# Patient Record
(demographics unavailable — no encounter records)

---

## 2025-04-01 NOTE — ASSESSMENT
[FreeTextEntry1] : The patient is a 70-year-old male with chief complaint of right knee pain.  He has a several year history of progressive pain in the right knee which is gotten worse over the last few months.  He started taking Motrin and doing physical therapy which is definitely helped.  He is very sore when he drives for period of time but only has mild pain with walking.  He has mild pain at night.  He struggles going from sitting to standing going up and down stairs and gets a clicking in his knee.  He has had no injections.  Examination of the right lower extremity reveals a normal neurovascular exam.  Examination the right knee reveals limited range of motion from 5 to 125 degrees with a varus deformity.  There is medial joint line pain with positive Madhuri's sign.  There is no lateral joint complaints or instability.  There is no effusion.  There is no redness, rashes or visible scars.  X-rays done in the office today of the right knee 4 views weightbearing shows tricompartmental arthritis with bone-on-bone changes of the medial compartment and a varus deformity.  There are no obvious tumors, mass or calcifications seen.  The plan at this time is to start a physical therapy program for the right knee.  This should help with his ability to climb stairs and go from sitting to standing.  He will continue his Advil as needed as that seems to be helping.  I will see him back in the office as needed.

## 2025-04-01 NOTE — HISTORY OF PRESENT ILLNESS
[9] : 9 [4] : 4 [FreeTextEntry5] : 69 y/o M presents for RT knee pain . pt noted to have pain on and off for a few years now . Pt 2x a week has been helping. rt knee klever when driving. motrin as needed

## 2025-04-24 NOTE — HISTORY OF PRESENT ILLNESS
[9] : 9 [4] : 4 [FreeTextEntry5] : 71 y/o M presents for RT knee pain . pt noted to not have much of an improvement since last visit. Pt 2x a week has been helping.

## 2025-04-24 NOTE — ASSESSMENT
[FreeTextEntry1] : The patient is here for right knee pain. He has tried PT with good relief but wishes to consider a CSI as his pain has persisted. He has a several year history of progressive pain in the right knee which is gotten worse over the last few months.  He started taking Motrin and doing physical therapy which is definitely helped.  He is very sore when he drives for period of time but only has mild pain with walking.  He has mild pain at night.  He struggles going from sitting to standing going up and down stairs and gets a clicking in his knee.  Right knee exam: Neurovascularly intact. Sensation intact. Examination the right knee reveals limited range of motion from 5 to 125 degrees with a varus deformity.  There is medial joint line pain with positive Madhuri's sign.  There is no lateral joint complaints or instability.  There is no effusion.  There is no redness, rashes or visible scars.  Under sterile conditions the right knee was injected with 5 cc of quarter percent plain Marcaine; 5 cc of 2% plain lidocaine and 80 mg of Depo-Medrol.  Patient tolerated the procedure well. Ultrasound guidance was used due to patient body habitus and for accuracy of placement.  The patient received a right knee CSI. He tolerated it well. Ultrasound was used. He will return in 4-6 weeks if not better for gel injections.

## 2025-04-24 NOTE — HISTORY OF PRESENT ILLNESS
[9] : 9 [4] : 4 [FreeTextEntry5] : 69 y/o M presents for RT knee pain . pt noted to not have much of an improvement since last visit. Pt 2x a week has been helping.